# Patient Record
Sex: MALE | Race: OTHER | HISPANIC OR LATINO | URBAN - METROPOLITAN AREA
[De-identification: names, ages, dates, MRNs, and addresses within clinical notes are randomized per-mention and may not be internally consistent; named-entity substitution may affect disease eponyms.]

---

## 2021-01-05 ENCOUNTER — EMERGENCY (EMERGENCY)
Facility: HOSPITAL | Age: 36
LOS: 1 days | Discharge: HOME | End: 2021-01-05
Payer: MEDICARE

## 2021-01-05 VITALS
SYSTOLIC BLOOD PRESSURE: 138 MMHG | TEMPERATURE: 97 F | HEART RATE: 71 BPM | DIASTOLIC BLOOD PRESSURE: 90 MMHG | RESPIRATION RATE: 18 BRPM | OXYGEN SATURATION: 99 %

## 2021-01-05 LAB — SARS-COV-2 RNA SPEC QL NAA+PROBE: SIGNIFICANT CHANGE UP

## 2021-01-05 PROCEDURE — U0003: CPT

## 2021-01-05 PROCEDURE — U0005: CPT

## 2021-01-05 PROCEDURE — 99283 EMERGENCY DEPT VISIT LOW MDM: CPT

## 2021-01-05 NOTE — ED PROVIDER NOTE - CLINICAL SUMMARY MEDICAL DECISION MAKING FREE TEXT BOX
Pt presenting to the ED for COVID-19 testing. Pt reports someone from his job tested positive for COVID. Denies any symptoms at this time.   On exam well appearing, non toxic, lungs CTA, speaking full sentences, no hypoxia or labored breathing while ambulating without supplemental oxygen.   -Lengthy discussion with patient regarding home quarantine, follow up with PMD, anticipatory guidance provided and supportive care recommended. Advised immediate return if worsening symptoms, strict return precautions, especially if short of breath, chest pain, inability to tolerate food/liquid. Pt given pre-printed [facility] Novel Coronavirus (COVID19) information packet in [place] which contains instructions on time frame of result and how to obtain. Pt verbalized understanding and agreement of plan.   ED  Jesika Abdullahi

## 2021-01-05 NOTE — ED PROVIDER NOTE - PATIENT PORTAL LINK FT
You can access the FollowMyHealth Patient Portal offered by [facility] by registering at the following website: [email or web address]. By joining "[employer]’s FollowMyHealth portal, you will also be able to view your health information using other applications (apps) compatible with our system.

## 2021-01-05 NOTE — ED ADULT TRIAGE NOTE - MEANS OF ARRIVAL
Anesthesia Evaluation     Patient summary reviewed and Nursing notes reviewed   NPO Solid Status: > 8 hours  NPO Liquid Status: > 8 hours           Airway   Mallampati: I  TM distance: >3 FB  Neck ROM: full  Narrow palate and Possible difficult intubation  Dental - normal exam     Pulmonary - normal exam   Cardiovascular - normal exam        Neuro/Psych  GI/Hepatic/Renal/Endo    (+)   diabetes mellitus type 2,     Musculoskeletal     Abdominal    Substance History      OB/GYN          Other                      Anesthesia Plan    ASA 2     MAC     Anesthetic plan, all risks, benefits, and alternatives have been provided, discussed and informed consent has been obtained with: patient.      
ambulatory

## 2021-01-05 NOTE — ED PROVIDER NOTE - OBJECTIVE STATEMENT
Pt presenting to the ED for COVID-19 testing. Pt reports someone from his job tested positive for COVID. Denies any symptoms at this time. Denies fever, chills, rash, loss of taste/smell, HA, neck pain, ear pain, weakness, fatigue, muscle aches, dizziness, LOC, CP, SOB, palpitations, URI sxs, sore throat, cough, NVD, abd pain, urinary sxs. Pt reports eating and drinking normal diet. Normal output. No limit of ADLs. Pt requesting testing at this time.     ED  Jesika Abdullahi

## 2022-08-25 NOTE — ED PROVIDER NOTE - DATE/TIME OF ACCEPTANCE
[FreeTextEntry1] : 39 yo M with small volume leakage prior to voiding. Cannot tolerate exam, definitely won't tolerate UDS. WIll treat empirically with oxybutynin.
05-Jan-2021 16:47